# Patient Record
(demographics unavailable — no encounter records)

---
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
## 2025-05-13 NOTE — PHYSICAL EXAM
[de-identified] : General: No acute distress Resp: Nonlabored Cards; WWP    Right small finger Skin: Intact, no ecchymosis, + swelling  DIPJ, nail plate missing but starting to regrow normally mild TTP dorsal DIPJ and PIPJ Motor: Fires FDS/FDP/EDC to finger Full Abduction and adduction of the fingers ROM: PIPJ  0-100 DIPJ:  0-80 MCPJ:  0-90 Can make a full fist Can extend all fingers No pain or instability of collaterals No numbness or tingling WWP  [de-identified] : 3 views of the right small finger were obtained in a local urgent care and reviewed in clinic showing a small avulsion fracture or off the dorsal aspect of the distal phalanx with overall maintained alignment and preservation of the growth plates
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
## 2025-05-13 NOTE — PHYSICAL EXAM
[de-identified] : General: No acute distress Resp: Nonlabored Cards; WWP    Right small finger Skin: Intact, no ecchymosis, + swelling  DIPJ, nail plate missing but starting to regrow normally mild TTP dorsal DIPJ and PIPJ Motor: Fires FDS/FDP/EDC to finger Full Abduction and adduction of the fingers ROM: PIPJ  0-100 DIPJ:  0-80 MCPJ:  0-90 Can make a full fist Can extend all fingers No pain or instability of collaterals No numbness or tingling WWP  [de-identified] : 3 views of the right small finger were obtained in a local urgent care and reviewed in clinic showing a small avulsion fracture or off the dorsal aspect of the distal phalanx with overall maintained alignment and preservation of the growth plates